# Patient Record
Sex: MALE | Race: WHITE | Employment: FULL TIME | URBAN - METROPOLITAN AREA
[De-identification: names, ages, dates, MRNs, and addresses within clinical notes are randomized per-mention and may not be internally consistent; named-entity substitution may affect disease eponyms.]

---
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
## 2023-01-29 ENCOUNTER — HOSPITAL ENCOUNTER (EMERGENCY)
Age: 23
Discharge: HOME | End: 2023-01-29
Attending: EMERGENCY MEDICINE
Payer: MEDICAID

## 2023-01-29 ENCOUNTER — APPOINTMENT (OUTPATIENT)
Dept: CT IMAGING | Age: 23
End: 2023-01-29
Attending: EMERGENCY MEDICINE
Payer: MEDICAID

## 2023-01-29 VITALS
HEIGHT: 65 IN | SYSTOLIC BLOOD PRESSURE: 126 MMHG | WEIGHT: 135 LBS | HEART RATE: 71 BPM | OXYGEN SATURATION: 97 % | BODY MASS INDEX: 22.49 KG/M2 | DIASTOLIC BLOOD PRESSURE: 70 MMHG | RESPIRATION RATE: 18 BRPM | TEMPERATURE: 98 F

## 2023-01-29 DIAGNOSIS — R11.2 NAUSEA AND VOMITING IN ADULT: Primary | ICD-10-CM

## 2023-01-29 DIAGNOSIS — R10.84 ABDOMINAL PAIN, GENERALIZED: ICD-10-CM

## 2023-01-29 LAB
ALBUMIN SERPL-MCNC: 4.9 G/DL (ref 3.4–5)
ALBUMIN/GLOB SERPL: 1.5 {RATIO} (ref 1–2)
ALP LIVER SERPL-CCNC: 81 U/L
ALT SERPL-CCNC: 32 U/L
ANION GAP SERPL CALC-SCNC: 6 MMOL/L (ref 0–18)
AST SERPL-CCNC: 17 U/L (ref 15–37)
BASOPHILS # BLD AUTO: 0.02 X10(3) UL (ref 0–0.2)
BASOPHILS NFR BLD AUTO: 0.1 %
BILIRUB SERPL-MCNC: 0.6 MG/DL (ref 0.1–2)
BILIRUB UR QL STRIP.AUTO: NEGATIVE
BUN BLD-MCNC: 10 MG/DL (ref 7–18)
CALCIUM BLD-MCNC: 9.9 MG/DL (ref 8.5–10.1)
CHLORIDE SERPL-SCNC: 104 MMOL/L (ref 98–112)
CLARITY UR REFRACT.AUTO: CLEAR
CO2 SERPL-SCNC: 29 MMOL/L (ref 21–32)
COLOR UR AUTO: YELLOW
CREAT BLD-MCNC: 0.9 MG/DL
EOSINOPHIL # BLD AUTO: 0.14 X10(3) UL (ref 0–0.7)
EOSINOPHIL NFR BLD AUTO: 1 %
ERYTHROCYTE [DISTWIDTH] IN BLOOD BY AUTOMATED COUNT: 12.1 %
GFR SERPLBLD BASED ON 1.73 SQ M-ARVRAT: 125 ML/MIN/1.73M2 (ref 60–?)
GLOBULIN PLAS-MCNC: 3.3 G/DL (ref 2.8–4.4)
GLUCOSE BLD-MCNC: 108 MG/DL (ref 70–99)
GLUCOSE UR STRIP.AUTO-MCNC: NEGATIVE MG/DL
HCT VFR BLD AUTO: 50 %
HGB BLD-MCNC: 17.2 G/DL
IMM GRANULOCYTES # BLD AUTO: 0.05 X10(3) UL (ref 0–1)
IMM GRANULOCYTES NFR BLD: 0.4 %
KETONES UR STRIP.AUTO-MCNC: NEGATIVE MG/DL
LEUKOCYTE ESTERASE UR QL STRIP.AUTO: NEGATIVE
LIPASE SERPL-CCNC: 114 U/L (ref 73–393)
LYMPHOCYTES # BLD AUTO: 0.8 X10(3) UL (ref 1–4)
LYMPHOCYTES NFR BLD AUTO: 5.6 %
MCH RBC QN AUTO: 30.1 PG (ref 26–34)
MCHC RBC AUTO-ENTMCNC: 34.4 G/DL (ref 31–37)
MCV RBC AUTO: 87.4 FL
MONOCYTES # BLD AUTO: 0.75 X10(3) UL (ref 0.1–1)
MONOCYTES NFR BLD AUTO: 5.3 %
NEUTROPHILS # BLD AUTO: 12.49 X10 (3) UL (ref 1.5–7.7)
NEUTROPHILS # BLD AUTO: 12.49 X10(3) UL (ref 1.5–7.7)
NEUTROPHILS NFR BLD AUTO: 87.6 %
NITRITE UR QL STRIP.AUTO: NEGATIVE
OSMOLALITY SERPL CALC.SUM OF ELEC: 288 MOSM/KG (ref 275–295)
PH UR STRIP.AUTO: 6.5 [PH] (ref 5–8)
PLATELET # BLD AUTO: 293 10(3)UL (ref 150–450)
POTASSIUM SERPL-SCNC: 3.9 MMOL/L (ref 3.5–5.1)
PROT SERPL-MCNC: 8.2 G/DL (ref 6.4–8.2)
PROT UR STRIP.AUTO-MCNC: NEGATIVE MG/DL
RBC # BLD AUTO: 5.72 X10(6)UL
SODIUM SERPL-SCNC: 139 MMOL/L (ref 136–145)
SP GR UR STRIP.AUTO: 1.01 (ref 1–1.03)
UROBILINOGEN UR STRIP.AUTO-MCNC: 0.2 MG/DL
WBC # BLD AUTO: 14.3 X10(3) UL (ref 4–11)

## 2023-01-29 PROCEDURE — 81015 MICROSCOPIC EXAM OF URINE: CPT | Performed by: EMERGENCY MEDICINE

## 2023-01-29 PROCEDURE — 96375 TX/PRO/DX INJ NEW DRUG ADDON: CPT

## 2023-01-29 PROCEDURE — 85025 COMPLETE CBC W/AUTO DIFF WBC: CPT | Performed by: EMERGENCY MEDICINE

## 2023-01-29 PROCEDURE — 99284 EMERGENCY DEPT VISIT MOD MDM: CPT

## 2023-01-29 PROCEDURE — 96374 THER/PROPH/DIAG INJ IV PUSH: CPT

## 2023-01-29 PROCEDURE — 74177 CT ABD & PELVIS W/CONTRAST: CPT | Performed by: EMERGENCY MEDICINE

## 2023-01-29 PROCEDURE — 81001 URINALYSIS AUTO W/SCOPE: CPT | Performed by: EMERGENCY MEDICINE

## 2023-01-29 PROCEDURE — 80053 COMPREHEN METABOLIC PANEL: CPT | Performed by: EMERGENCY MEDICINE

## 2023-01-29 PROCEDURE — 83690 ASSAY OF LIPASE: CPT | Performed by: EMERGENCY MEDICINE

## 2023-01-29 RX ORDER — KETOROLAC TROMETHAMINE 30 MG/ML
30 INJECTION, SOLUTION INTRAMUSCULAR; INTRAVENOUS ONCE
Status: COMPLETED | OUTPATIENT
Start: 2023-01-29 | End: 2023-01-29

## 2023-01-29 RX ORDER — DIPHENHYDRAMINE HYDROCHLORIDE 50 MG/ML
50 INJECTION INTRAMUSCULAR; INTRAVENOUS ONCE
Status: COMPLETED | OUTPATIENT
Start: 2023-01-29 | End: 2023-01-29

## 2023-01-29 RX ORDER — METOCLOPRAMIDE HYDROCHLORIDE 5 MG/ML
10 INJECTION INTRAMUSCULAR; INTRAVENOUS ONCE
Status: COMPLETED | OUTPATIENT
Start: 2023-01-29 | End: 2023-01-29

## 2023-01-29 RX ORDER — METOCLOPRAMIDE 10 MG/1
10 TABLET ORAL 3 TIMES DAILY PRN
Qty: 20 TABLET | Refills: 0 | Status: SHIPPED | OUTPATIENT
Start: 2023-01-29 | End: 2023-02-28

## 2023-01-30 NOTE — ED INITIAL ASSESSMENT (HPI)
Pt to ed with c/o diffuse abdominal pain that started around noon. States it was initially constant then got better but pain returned just pta. States he has been \"spitting up food\" and nauseous but no vomiting.

## 2023-01-30 NOTE — DISCHARGE INSTRUCTIONS
Rest  Push Fluids with light bland diet as tolerated  Tylenol for fever  Reglan nausea medicine every 8 hours around-the-clock for the first day, then as needed  Over-the-counter antidiarrheal medications like Kaopectate or Pepto-Bismol if diarrhea starts  Follow-up with your primary care doctor within a few days    If your symptoms worsen, go to the hospital emergency department

## 2024-01-10 ENCOUNTER — OFFICE VISIT (OUTPATIENT)
Dept: INTERNAL MEDICINE CLINIC | Facility: CLINIC | Age: 24
End: 2024-01-10
Payer: COMMERCIAL

## 2024-01-10 VITALS
HEART RATE: 89 BPM | DIASTOLIC BLOOD PRESSURE: 80 MMHG | TEMPERATURE: 99 F | OXYGEN SATURATION: 98 % | HEIGHT: 65 IN | SYSTOLIC BLOOD PRESSURE: 128 MMHG | BODY MASS INDEX: 23.79 KG/M2 | WEIGHT: 142.81 LBS

## 2024-01-10 DIAGNOSIS — R10.12 LUQ PAIN: ICD-10-CM

## 2024-01-10 DIAGNOSIS — M79.602 LEFT ARM PAIN: ICD-10-CM

## 2024-01-10 DIAGNOSIS — R05.1 ACUTE COUGH: Primary | ICD-10-CM

## 2024-01-10 RX ORDER — DOXYCYCLINE HYCLATE 100 MG/1
100 CAPSULE ORAL 2 TIMES DAILY
Qty: 20 CAPSULE | Refills: 0 | Status: SHIPPED | OUTPATIENT
Start: 2024-01-10

## 2024-01-10 NOTE — PROGRESS NOTES
José Abraham is a 23 year old male.  HPI:   Had a cold 1 mo ago  did get better but the last 5d has had a cough , PND   mucous is green     nasal valarie    on mucinex and delasym       no fever      ears  OK   +frontal HAs      when takes deep breathe feels like a fullness in the LUQ area     this is for few months     The upper back is also sore near the left scapula     radiates down the LUE   no weakness aware of      Current Outpatient Medications   Medication Sig Dispense Refill    Meloxicam 15 MG Oral Tab Take 1 tablet (15 mg total) by mouth daily. (Patient not taking: Reported on 1/29/2023) 30 tablet 0    escitalopram (LEXAPRO) 10 MG Oral Tab Take 1 tablet (10 mg total) by mouth daily. (Patient not taking: Reported on 1/29/2023) 30 tablet 2      Past Medical History:   Diagnosis Date    Abdominal pain     Anxiety     Back pain     Belching     Bloating     Chest pain     Chest pain on exertion     Diarrhea, unspecified     Fatigue     Flatulence/gas pain/belching     Food intolerance     Frequent use of laxatives     Headache disorder     Heart palpitations     Heartburn     History of cardiac murmur     Indigestion     Loss of appetite     Murmur, cardiac     nl ECHO 2011    Nausea     Pain in joints     Shortness of breath     Sleep disturbance     Stress     Uncomfortable fullness after meals     Visual impairment     contacts    Wears glasses     Wheezing       Social History:  Social History     Socioeconomic History    Marital status: Single   Tobacco Use    Smoking status: Former    Smokeless tobacco: Never   Vaping Use    Vaping Use: Former   Substance and Sexual Activity    Alcohol use: No    Drug use: No    Sexual activity: Not Currently   Other Topics Concern    Caffeine Concern Yes     Comment: sometimes    Exercise No        REVIEW OF SYSTEMS:   GENERAL HEALTH: feels well otherwise  SKIN: denies rashes  RESPIRATORY: denies shortness of breath   CARDIOVASCULAR: denies chest pain on  exertion        EXAM:   /80   Pulse 89   Temp 98.8 °F (37.1 °C) (Temporal)   Ht 5' 5\" (1.651 m)   Wt 142 lb 12.8 oz (64.8 kg)   SpO2 98%   BMI 23.76 kg/m²   GENERAL: well developed, well nourished,in no apparent distress  SKIN: no rashes  TMs nl  throat nl    NECK: supple,no adenopathy  LUNGS: clear to auscultation  CARDIO: RRR without murmur  ABD:  soft NT  no mass or HSM        ASSESSMENT AND PLAN:   1. Acute cough  Ariel treat w doxy 100 bID for 10d    call if not getting better        2. LUQ pain  Ck US    had CT 1 yr ago that was OK      - US ABDOMEN COMPLETE (CPT=76700); Future    3. Left arm pain  Sounds like nerve impingement    ck EMG     - EMG (at [facility])     The patient indicates understanding of these issues and agrees to the plan.  .

## 2024-03-03 ENCOUNTER — HOSPITAL ENCOUNTER (OUTPATIENT)
Dept: ULTRASOUND IMAGING | Age: 24
Discharge: HOME | End: 2024-03-03
Attending: FAMILY MEDICINE
Payer: COMMERCIAL

## 2024-03-03 ENCOUNTER — HOSPITAL ENCOUNTER (OUTPATIENT)
Dept: ULTRASOUND IMAGING | Age: 24
End: 2024-03-03
Attending: FAMILY MEDICINE
Payer: COMMERCIAL

## 2024-03-03 DIAGNOSIS — R10.12 LUQ PAIN: ICD-10-CM

## 2024-03-03 PROCEDURE — 76700 US EXAM ABDOM COMPLETE: CPT | Performed by: FAMILY MEDICINE

## 2024-03-16 ENCOUNTER — LAB ENCOUNTER (OUTPATIENT)
Dept: LAB | Age: 24
End: 2024-03-16
Attending: FAMILY MEDICINE
Payer: COMMERCIAL

## 2024-03-16 ENCOUNTER — OFFICE VISIT (OUTPATIENT)
Dept: INTERNAL MEDICINE CLINIC | Facility: CLINIC | Age: 24
End: 2024-03-16
Payer: COMMERCIAL

## 2024-03-16 VITALS
BODY MASS INDEX: 23.69 KG/M2 | SYSTOLIC BLOOD PRESSURE: 124 MMHG | WEIGHT: 142.19 LBS | TEMPERATURE: 98 F | DIASTOLIC BLOOD PRESSURE: 82 MMHG | HEART RATE: 67 BPM | HEIGHT: 65 IN | OXYGEN SATURATION: 98 %

## 2024-03-16 DIAGNOSIS — R14.0 ABDOMINAL BLOATING: ICD-10-CM

## 2024-03-16 DIAGNOSIS — R14.0 ABDOMINAL BLOATING: Primary | ICD-10-CM

## 2024-03-16 DIAGNOSIS — M25.50 PAIN IN JOINT, MULTIPLE SITES: ICD-10-CM

## 2024-03-16 LAB
CRP SERPL-MCNC: <0.29 MG/DL (ref ?–0.3)
ERYTHROCYTE [SEDIMENTATION RATE] IN BLOOD: 4 MM/HR
IGA SERPL-MCNC: 93.6 MG/DL (ref 70–312)
RHEUMATOID FACT SERPL-ACNC: <10 IU/ML (ref ?–15)
URATE SERPL-MCNC: 7 MG/DL

## 2024-03-16 PROCEDURE — 85652 RBC SED RATE AUTOMATED: CPT

## 2024-03-16 PROCEDURE — 99214 OFFICE O/P EST MOD 30 MIN: CPT | Performed by: FAMILY MEDICINE

## 2024-03-16 PROCEDURE — 86431 RHEUMATOID FACTOR QUANT: CPT

## 2024-03-16 PROCEDURE — 36415 COLL VENOUS BLD VENIPUNCTURE: CPT

## 2024-03-16 PROCEDURE — 84550 ASSAY OF BLOOD/URIC ACID: CPT

## 2024-03-16 PROCEDURE — 86364 TISS TRNSGLTMNASE EA IG CLAS: CPT

## 2024-03-16 PROCEDURE — 82784 ASSAY IGA/IGD/IGG/IGM EACH: CPT

## 2024-03-16 PROCEDURE — 86140 C-REACTIVE PROTEIN: CPT

## 2024-03-16 NOTE — PROGRESS NOTES
José Abraham is a 23 year old male.  HPI:   Wonders if he has gluten allergy    feels certain foods like breads can get a rash at times    can get gas an or diarrhea         can feel like a cough as well or even breathing gets affected      had issues in HS but got better      occ gets aches in the legs or knee or even the back , chest     No joint pains today     Abd does feel ok as well at present        No current outpatient medications on file.      Past Medical History:   Diagnosis Date    Abdominal pain     Anxiety     Back pain     Belching     Bloating     Chest pain     Chest pain on exertion     Diarrhea, unspecified     Fatigue     Flatulence/gas pain/belching     Food intolerance     Frequent use of laxatives     Headache disorder     Heart palpitations     Heartburn     History of cardiac murmur     Indigestion     Loss of appetite     Murmur, cardiac     nl ECHO 2011    Nausea     Pain in joints     Shortness of breath     Sleep disturbance     Stress     Uncomfortable fullness after meals     Visual impairment     contacts    Wears glasses     Wheezing       Social History:  Social History     Socioeconomic History    Marital status: Single   Tobacco Use    Smoking status: Former    Smokeless tobacco: Never   Vaping Use    Vaping Use: Former   Substance and Sexual Activity    Alcohol use: No    Drug use: No    Sexual activity: Not Currently   Other Topics Concern    Caffeine Concern Yes     Comment: sometimes    Exercise No        REVIEW OF SYSTEMS:   GENERAL HEALTH: feels well otherwise       EXAM:   /82 (BP Location: Right arm, Patient Position: Sitting, Cuff Size: adult)   Pulse 67   Temp 97.8 °F (36.6 °C) (Temporal)   Ht 5' 5\" (1.651 m)   Wt 142 lb 3.2 oz (64.5 kg)   SpO2 98%   BMI 23.66 kg/m²   GENERAL: well developed, well nourished,in no apparent distress  ABD:  soft NT  no mass       ASSESSMENT AND PLAN:   1. Abdominal bloating  Did have EGD few yrs ago that was OK      was on PPI     certainly could be d/t celiac dx   will check labs      - CELIAC DISEASE SCREEN Reflex [E]; Future    2. Pain in joint, multiple sites  Check labs and proceed      FH of RA and gout      - Sed Rate, Benignoren (Automated) [E]; Future  - Rheumatoid Arthritis Factor [E]; Future  - C-Reactive Protein [E]; Future  - Uric Acid; Future     The patient indicates understanding of these issues and agrees to the plan.  .

## 2024-03-18 LAB — TTG IGA SER-ACNC: <0.2 U/ML (ref ?–7)

## 2024-03-29 DIAGNOSIS — R14.0 ABDOMINAL BLOATING: Primary | ICD-10-CM

## 2024-03-29 DIAGNOSIS — M25.50 PAIN IN JOINT, MULTIPLE SITES: ICD-10-CM

## 2024-06-17 ENCOUNTER — OFFICE VISIT (OUTPATIENT)
Dept: ELECTROPHYSIOLOGY | Facility: HOSPITAL | Age: 24
End: 2024-06-17
Attending: FAMILY MEDICINE
Payer: COMMERCIAL

## 2024-06-17 DIAGNOSIS — M79.602 LEFT ARM PAIN: ICD-10-CM

## 2024-06-17 PROCEDURE — 95886 MUSC TEST DONE W/N TEST COMP: CPT

## 2024-06-17 PROCEDURE — 95909 NRV CNDJ TST 5-6 STUDIES: CPT

## 2024-06-17 NOTE — PROCEDURES
[address] [address]  [phone]            Patient: WAYNE NELSON Gender: Male  Patient ID: [id number] YOB: 2000      Visit Date: 6/17/2024 8:53 AM  Patient Age On Visit Date: 23 Years      Sensory NCS      Nerve / Sites Rec. Site Onset Lat Peak Lat Ref. NP Amp Ref. PP Amp Ref. SPAR Ref. Segments Distance Onset Evan     ms ms ms µV µV µV µV % %  cm m/s   L Median - Index finger      Wrist Index 2.54 3.23 ?3.40 33.8 ?15.0 56.7 ?20.0  ?40.00 Wrist - Index 13 51.1   L Ulnar - Dig V      Wrist Dig V 2.13 2.81 ?3.10 29.6 ?10.0 42.7 ?15.0  ?40.00 Wrist - Dig V 11 51.8   L Radial - Wrist      Forearm Wrist 2.17 2.67 ?2.90 29.5 ?15.0 40.5 ?15.0  ?40.00 Forearm - Wrist 10 46.2             Motor NCS      Nerve / Sites Rec. Site Lat Ref. Amp Ref. Seq Amp Ref. SPAR Ref. Segments Dist. Evan Ref.     ms ms mV mV % % % %  cm m/s m/s   L Median - APB      Wrist APB 3.00 ?4.40 12.2 ?4.0 100   ?50.00 Wrist - APB 7 23       Elbow APB 6.23  13.3  109 ?115   Elbow - Wrist 22 68 ?49   L Ulnar - ADM      Wrist ADM 3.48 ?3.60 8.4 ?5.0 100   ?50.00 Wrist - ADM 7 20       B.Elbow ADM 5.29  6.8  80.9 ?70   B.Elbow - Wrist 16 88 ?49      A.Elbow ADM 8.35  7.3  107 ?115   A.Elbow - B.Elbow 16 52 ?49           EMG Summary Table     Spontaneous MUAP Recruitment   Muscle Nerve Roots IA Fib PSW Fasc H.F. Amp Dur. PPP Pattern   L. Deltoid Axillary C5-C6 N None None None None N N N N   L. Biceps brachii Musculocut. C5-C6 N None None None None N N N N   L. Triceps brachii Radial C6-C8 N None None None None N N N N   L. C5 paraspinal Spinal C5- N None None None None       L. Flexor carpi radialis Median C6-C7 N None None None None N N N N   L. First dorsal interosseous Ulnar C8-T1 N None None None None N N N N         Summary:  The L median, L ulnar and L radial SNAPs were normal.    The L median and L ulnar motor responses were normal.    The needle study of selected  muscles of the LUE (C5-T1 myotomes) and the L C5-C6 paraspinal muscles was normal.    The patient declined any RUE testing as he has no symptoms in the RUE.    Conclusion:   This is a normal study. There is no electrodiagnostic evidence of a LUE large fibre neuropathy or a L cervical radiculopathy at this time.         Axel Tobin D.O.  Neurology

## 2024-12-06 ENCOUNTER — OFFICE VISIT (OUTPATIENT)
Dept: FAMILY MEDICINE CLINIC | Facility: CLINIC | Age: 24
End: 2024-12-06
Payer: COMMERCIAL

## 2024-12-06 VITALS
RESPIRATION RATE: 16 BRPM | OXYGEN SATURATION: 99 % | SYSTOLIC BLOOD PRESSURE: 118 MMHG | TEMPERATURE: 98 F | HEART RATE: 67 BPM | BODY MASS INDEX: 24 KG/M2 | DIASTOLIC BLOOD PRESSURE: 70 MMHG | WEIGHT: 143 LBS

## 2024-12-06 DIAGNOSIS — L03.032 PARONYCHIA OF GREAT TOE OF LEFT FOOT: Primary | ICD-10-CM

## 2024-12-06 PROCEDURE — 99213 OFFICE O/P EST LOW 20 MIN: CPT | Performed by: NURSE PRACTITIONER

## 2024-12-06 RX ORDER — CEPHALEXIN 500 MG/1
500 CAPSULE ORAL 2 TIMES DAILY
Qty: 14 CAPSULE | Refills: 0 | Status: SHIPPED | OUTPATIENT
Start: 2024-12-06 | End: 2024-12-13

## 2025-07-18 NOTE — ED INITIAL ASSESSMENT (HPI)
Addended by: YVAN SIGALA on: 7/18/2025 02:17 PM     Modules accepted: Orders     C/o upper mid abd pain since tues- headache began last night- abd pain has been waking him up the last 2 nights--  No vomiting, no diarrhea

## (undated) DEVICE — REM POLYHESIVE ADULT PATIENT RETURN ELECTRODE: Brand: VALLEYLAB

## (undated) DEVICE — TRAP 4 CPTR CHMBR N EZ INLN

## (undated) DEVICE — NET SPECIMEN RETRIEVAL BLUE

## (undated) DEVICE — FORCEP BIOPSY RJ4 LG CAP W/ND

## (undated) DEVICE — MEDI-VAC SUCTION HANDLE REGULAR CAPACITY: Brand: CARDINAL HEALTH

## (undated) DEVICE — Device: Brand: DEFENDO AIR/WATER/SUCTION AND BIOPSY VALVE

## (undated) DEVICE — 3M™ RED DOT™ MONITORING ELECTRODE WITH FOAM TAPE AND STICKY GEL, 50/BAG, 20/CASE, 72/PLT 2570: Brand: RED DOT™

## (undated) DEVICE — SNARE CAPTIFLEX STD OVAL OLY

## (undated) DEVICE — ENDOSCOPY PACK UPPER: Brand: MEDLINE INDUSTRIES, INC.

## (undated) DEVICE — SNARE 9MM 230CM 2.4MM EXACTO

## (undated) DEVICE — 1200CC GUARDIAN II: Brand: GUARDIAN

## (undated) DEVICE — MEDI-VAC NON-CONDUCTIVE SUCTION TUBING: Brand: CARDINAL HEALTH

## (undated) DEVICE — FILTERLINE NASAL ADULT O2/CO2

## (undated) NOTE — LETTER
[facility] CARE IN [address]  [facility] [address]  Dept: [phone]  Dept Fax: [phone]         March 11, 2020    Patient: Marilyn Melendez   YOB: 2000   Date of Visit: 3/11/2020       To Whom It May Concer

## (undated) NOTE — ED AVS SNAPSHOT
[facility] in [address] [place] [address] Drive,[address] Floor    [address] [place] [address]    Phone:  [phone]    Fax:  [phone]           Jennifer Ambriz   MRN: [id number]    Department:  [facility]   Date of Visit:  4/27/2017 These medications were sent to [address] [place] [address], [address] [place] [address] Jaja Stratton [phone], [phone] [address] [address]     Phone:  [phone]    - Amoxicillin-Pot Clavulanate 875-125 MG Tabs              Discharge I If you have any problems with your follow-up, please call our  at [phone]. Si usted tiene algun problema con mcdaniels sequimiento, por favor llame a nuestro adminstrador de casos al [phone].     Expect to receive an electronic reques Sena Ribeiro [address] ([address]) Anna ([address] [address] [facility]9   [facility] [address] ([address] [facility]) [address]  (Do Sign Up Forms link in the Additional Information box on the right. [employer] Questions? Call [phone] for help. [employer] is NOT to be used for urgent needs. For medical emergencies, dial 911.

## (undated) NOTE — LETTER
Date: 11/14/2019    Patient Name: Didi Martinez          To Whom it may concern: This letter has been written at the patient's request. The above patient was seen at the [facility] for treatment of a medical condition.     This patient

## (undated) NOTE — LETTER
Date & Time: 1/26/2019, 3:32 PM  Patient: Miriam Antunez  Encounter Provider(s):    Nirmala Escobedo MD       To Whom It May Concern:    Ebony Gilloter was seen and treated in our department on 1/26/2019. He can return to school.     If you have any q

## (undated) NOTE — MR AVS SNAPSHOT
After Visit Summary   6/17/2024    José Abraham   MRN: [id number]           Visit Information     Date & Time  6/17/2024  8:45 AM Provider  Byron Tobin DO [facility] Neurodiagnostics Dept. Phone  [phone]      Allergies as of 6/17/2024  Review status set to Review Complete on 4/29/2024       Noted Reaction Type Reactions    Biaxin [clarithromycin] 04/02/2012    HIVES    Amoxicillin 04/02/2012        Vomiting       Your Current Medications        Dosage    pantoprazole 40 MG Oral Tab EC Take one tablet (40 mg total) by mouth once daily, 30 minutes prior to breakfast.      Diagnoses for This Visit    Left arm pain   [697336]             We Ordered the Following     Normal Orders This Visit    EMG/NCV [NEU5 CUSTOM]                 Did you know that [facility] primary care physicians now offer Video Visits through [employer] for adult patients for a variety of conditions such as allergies, back pain and cold symptoms? Skip the drive and waiting room and online chat with a doctor face-to-face using your web-cam enabled computer or mobile device wherever you are. Video Visits cost $50 and can be paid hassle-free using a credit, debit, or health savings card.  Not active on [employer]? Ask us how to get signed up today!          If you receive a survey from [employer], please take a few minutes to complete it and provide feedback. We strive to deliver the best patient experience and are looking for ways to make improvements. Your feedback will help us do so. For more information on [employer], please visit [email or web address]           No text in SmartText           No text in SmartText

## (undated) NOTE — MR AVS SNAPSHOT
[place]  [address] [facility]  [address] [facility] [address] [phone]               Thank you for choosing us for your health care visit with Jyoti Morales MD.  We are glad to serve you and happy to provide you with this sum Healthy nutrition starts as early as infancy with breastfeeding. Once your baby begins eating solid foods, introduce nutritious foods early on and often. Sometimes toddlers need to try a food 10 times before they actually accept and enjoy it.  It is also im

## (undated) NOTE — LETTER
Date & Time: 3/21/2019, 11:49 AM  Patient: Roque Padron  Encounter Provider(s):    Levar Blackburn MD       To Whom It May Concern:    Ana Schneider was seen and treated in our department on 3/21/2019.  He should not return to school until 3/25/19

## (undated) NOTE — ED AVS SNAPSHOT
Viridiana Ba   MRN: [id number]    Department:  [facility] Emergency Department in [place]   Date of Visit:  1/26/2019           Disclosure     Insurance plans vary and the physician(s) referred by the ER may not be covered by your plan.  Please con tell this physician (or your personal doctor if your instructions are to return to your personal doctor) about any new or lasting problems. The primary care or specialist physician will see patients referred from the [facility] Emergency Department.  Suzanne Drake

## (undated) NOTE — LETTER
[facility] [employer] of Child Health Examination       Student's Name  Kelsey Mckeon Title                           Date     Signature HEALTH HISTORY          TO BE COMPLETED AND SIGNED BY PARENT/GUARDIAN AND VERIFIED BY HEALTH CARE PROVIDER    ALLERGIES  (Food, drug, insect, other)  Biaxin [Clarithromycin];  Amoxicillin MEDICATION  (List all prescribed or taken on a regular basis.)  No cu /60 (BP Location: Left arm, Patient Position: Sitting, Cuff Size: adult)   Pulse 65   Temp 98.4 °F (36.9 °C) (Oral)   Resp 12   Ht 64\"   Wt 118 lb 8 oz   SpO2 98%   BMI 20.34 kg/m²     DIABETES SCREENING  BMI>85% age/sex  No And any two of the follo Cardiovascular/HTN Yes  Nutritional status Yes    Respiratory Yes                   Diagnosis of Asthma: No Mental Health Yes        Currently Prescribed Asthma Medication:            Quick-relief  medication (e.g. Short Acting Beta Antagonist):  No

## (undated) NOTE — LETTER
Date: 7/8/2021      Patient Name: Marichuy Yang Barnard Jax Villegas          To Whom it may concern: This letter has been written at the patient's request. The above patient [facility] [place] [facility] [place] [facility] condition.     This patient sh

## (undated) NOTE — ED AVS SNAPSHOT
Myles Villalba   MRN: [id number]    Department:  [facility] Emergency Department in [place]   Date of Visit:  3/13/2020           Disclosure     Insurance plans vary and the physician(s) referred by the ER may not be covered by your plan.  Please con tell this physician (or your personal doctor if your instructions are to return to your personal doctor) about any new or lasting problems. The primary care or specialist physician will see patients referred from the [facility] Emergency Department.  Bennett Alvarez

## (undated) NOTE — ED AVS SNAPSHOT
Mireille Cohen   MRN: [id number]    Department:  [facility] Emergency Department in [place]   Date of Visit:  3/21/2019           Disclosure     Insurance plans vary and the physician(s) referred by the ER may not be covered by your plan.  Please con tell this physician (or your personal doctor if your instructions are to return to your personal doctor) about any new or lasting problems. The primary care or specialist physician will see patients referred from the [facility] Emergency Department.  Chalo Hemphill

## (undated) NOTE — LETTER
Date: 1/30/2020    Patient Name: Miriam Antunez          To Whom it may concern: This letter has been written at the patient's request. The above patient was seen at the [facility] for treatment of a medical condition.     This patient

## (undated) NOTE — LETTER
Date: 11/14/2019    Patient Name: Jordy Alonzo          To Whom it may concern: This letter has been written at the patient's request. The above patient was seen at the [facility] for treatment of a medical condition.     This patient

## (undated) NOTE — LETTER
06/12/19        [facility]  [place] [address]      Dear Alex Jo records indicate that you have outstanding lab work and or testing that was ordered for you and has not yet been completed: Measles Immunity Testing

## (undated) NOTE — LETTER
Date: 1/30/2020    Patient Name: Marilyn Melendez          To Whom it may concern: This letter has been written at the patient's request. The above patient was seen at the [facility] for treatment of a medical condition.     This patient